# Patient Record
Sex: MALE | Race: BLACK OR AFRICAN AMERICAN | Employment: FULL TIME | RURAL
[De-identification: names, ages, dates, MRNs, and addresses within clinical notes are randomized per-mention and may not be internally consistent; named-entity substitution may affect disease eponyms.]

---

## 2018-06-20 ENCOUNTER — OFFICE VISIT (OUTPATIENT)
Dept: FAMILY MEDICINE CLINIC | Age: 48
End: 2018-06-20

## 2018-06-20 VITALS
WEIGHT: 199 LBS | RESPIRATION RATE: 18 BRPM | HEART RATE: 71 BPM | BODY MASS INDEX: 27.86 KG/M2 | SYSTOLIC BLOOD PRESSURE: 140 MMHG | OXYGEN SATURATION: 99 % | DIASTOLIC BLOOD PRESSURE: 105 MMHG | HEIGHT: 71 IN | TEMPERATURE: 97.6 F

## 2018-06-20 DIAGNOSIS — D50.9 MICROCYTIC ANEMIA: ICD-10-CM

## 2018-06-20 DIAGNOSIS — R35.0 BENIGN PROSTATIC HYPERPLASIA WITH URINARY FREQUENCY: ICD-10-CM

## 2018-06-20 DIAGNOSIS — Z00.00 ROUTINE GENERAL MEDICAL EXAMINATION AT A HEALTH CARE FACILITY: Primary | ICD-10-CM

## 2018-06-20 DIAGNOSIS — N40.1 BENIGN PROSTATIC HYPERPLASIA WITH URINARY FREQUENCY: ICD-10-CM

## 2018-06-20 DIAGNOSIS — E78.5 HYPERLIPIDEMIA, UNSPECIFIED HYPERLIPIDEMIA TYPE: ICD-10-CM

## 2018-06-20 DIAGNOSIS — Z23 ENCOUNTER FOR IMMUNIZATION: ICD-10-CM

## 2018-06-20 DIAGNOSIS — R03.0 ELEVATED BLOOD-PRESSURE READING WITHOUT DIAGNOSIS OF HYPERTENSION: ICD-10-CM

## 2018-06-20 NOTE — PROGRESS NOTES
Mago Belcher is a 56 y.o. male who presents to the office today with the following:  Chief Complaint   Patient presents with   [facility] Establish Care     check up       No Known Allergies    No current outpatient prescriptions on file. No current facility-administered medications for this visit. Past Medical History:   Diagnosis Date    Hyperlipidemia        No past surgical history on file. History   Smoking Status    Never Smoker   Smokeless Tobacco    Not on file       Family History   Problem Relation Age of Onset    Lupus Mother     Cancer Father      lung         History of Present Illness:  Patient here for routine medical checkup and medical follow-up    Past medical history significant only for hyperlipidemia with a cholesterol 250 for a couple of years ago. Patient states she is trying to work on a healthy diet and would like to have this rechecked. Blood pressure was elevated today. He states he does not have a history of hypertension. Blood pressure has been normal when he is at the dentist office. He admits he is quite anxious today. He has no cardiac complaints. There is a family history of hypertension. He is willing to have some lab work and schedule follow-up. Patient has a elevated BMI however he is quite muscular and carries a lot of his weight in his muscle mass. He does admit however that he used to jog and has not been recently. He plans to restart his exercise program    Patient states he has always had a sensitive GI tract. Since childhood he cannot eat chocolate without getting diarrhea. Now he states he gets diarrhea couple times a month especially after eating certain foods which are fatty. No abdominal pain or other GI complaints. No bleeding noted. He does get some perirectal itching at times. There is no family history of colon cancer. He mentioned he was interested in being screened for colon cancer.   New guidelines indicate that he could be screened at his age with a colonoscopy. He is aware but does not want to proceed with that at present. Digital rectal exam was negative today. Hemoccult was -6/18    His past medical history is otherwise negative    His father had lung cancer. No other cancer in first-degree relatives    His prostate was minimally enlarged on exam 6/18. He states he has noted he urinates a bit more frequently as he has gotten older. No personal family history prostate cancer. PSA 6/18    Patient works as a  in a private home  Does not smoke  Does drink some alcohol    Is been over 10 years since she has had a tetanus booster and he was given a Tdap today          Review of Systems:    Review of systems negative except as noted above      Physical Exam:  Visit Vitals    BP (!) 140/105    Pulse 71    Temp 97.6 °F (36.4 °C) (Temporal)    Resp 18    Ht 5' 11\" (1.803 m)    Wt 199 lb (90.3 kg)    SpO2 99%    BMI 27.75 kg/m2     Vitals:    06/20/18 1136 06/20/18 1215   BP: (!) 145/111 (!) 140/105   BP 1 Location: Right arm    BP Patient Position: Sitting    Pulse: 71    Resp: 18    Temp: 97.6 °F (36.4 °C)    TempSrc: Temporal    SpO2: 99%    Weight: 199 lb (90.3 kg)    Height: 5' 11\" (1.803 m)      Patient no acute distress vital signs stable as above on repeat bilaterally  Head is normocephalic  External ears normal.  Ear canals normal TMs were clear. Hearing was normal  Eyes PERRLA. EOMs full. Sclera clear  Nose within normal limits. Nares  normal.  No significant congestion  OP mucosa normal, no obvious lesions. Pharynx normal.  No erythema or exudate. Structures midline. Good dental repair  Neck no nodes no masses no bruits  Chest was clear no wheezes rhonchi or rales. Good air exchange  Cor regular rate and rhythm no S3-S4 no murmurs  Abdomen no HSM no masses soft and was nontender  Normal male genitalia. Normal phallus. Descended testes.   No masses no hernia  External rectal area was normal.  Digital exam revealed no masses. Prostate mildly enlarged. Normal texture. No nodules  Extremities no edema. Nontender. Good range of motion  Gait and gross neurologic exam were normal    Assessment/Plan:  1. Routine general medical examination at a health care facility      2. Elevated blood-pressure reading without diagnosis of hypertension  Patient did have elevated blood pressure today. He admits he is quite nervous. Previous blood pressure readings have been normal according to the patient. I am checking lab work today. He is going to work on his diet and excise program.  He will be back in 1 month for recheck and reassessment of his blood pressure  - CBC WITH AUTOMATED DIFF  - METABOLIC PANEL, COMPREHENSIVE  - URINALYSIS W/ RFLX MICROSCOPIC  - TSH 3RD GENERATION  - LIPID PANEL    3. Hyperlipidemia, unspecified hyperlipidemia type  We will repeat lab work today    4. Benign prostatic hyperplasia with urinary frequency  Minimally enlarged prostate on exam.  Mild symptoms. Checking PSA. - PROSTATE SPECIFIC AG    5. Encounter for immunization    - Tetanus, diphtheria toxoids and acellular pertussis (TDAP) vaccine, in individuals >=7 years, IM    6. Screening for colon cancer. Hemoccult was negative today. Patient is going to call me when he is ready to proceed with colonoscopy    Patient Instructions   Lab work today  Work on [employer] and exercise  Follow-up 1 month  Call if you would like to schedule screening colonoscopy          Continue current therapy plan except for indicated above. Verbal and written instructions (see AVS) provided.  Patient expresses understanding of diagnosis and treatment plan. Follow-up Disposition:  Return in about 1 month (around 7/20/2018). Alissa Romo.  Alberto Lopez MD

## 2018-06-20 NOTE — MR AVS SNAPSHOT
[facility] 6 
[phone] Patient: Rubi Driver MRN: [id number] HXO:[birth year] Visit Information Date & Time Provider Department Dept. Phone Encounter #  
 6/20/2018 11:10 AM Abe Jarrell MD [address] [place] [address] [phone] [id number] Follow-up Instructions Return in about 1 month (around 7/20/2018). Your Appointments 7/18/2018 11:30 AM  
Any with Abe Jarrell MD  
[address] [place] [address] [facility] Appt Note: 1 mo f/u BP, CP$40/km/6.20.2018  
 [address] [phone]  
[phone]  
  
   
 [address] [address] Upcoming Health Maintenance Date Due DTaP/Tdap/Td series (1 - Tdap) 9/29/1992 Influenza Age 5 to Adult 8/1/2018 Allergies as of 6/20/2018  Review Complete On: 6/20/2018 By: Abe Jarrell MD  
 No Known Allergies Current Immunizations  Never Reviewed Name Date Tdap  Incomplete Not reviewed this visit You Were Diagnosed With   
  
 Codes Comments Routine general medical examination at a health care facility    -  Primary ICD-10-CM: Z00.00 ICD-9-CM: V70.0 Elevated blood-pressure reading without diagnosis of hypertension     ICD-10-CM: R03.0 ICD-9-CM: 796.2 Hyperlipidemia, unspecified hyperlipidemia type     ICD-10-CM: E78.5 ICD-9-CM: 272.4 Benign prostatic hyperplasia with urinary frequency     ICD-10-CM: N40.1, R35.0 ICD-9-CM: 600.01, 788.41 Encounter for immunization     ICD-10-CM: B53 ICD-9-CM: V03.89 Vitals BP Pulse Temp Resp Height(growth percentile) Weight(growth percentile) (!) 140/105 71 97.6 °F (36.4 °C) (Temporal) 18 5' 11\" (1.803 m) 199 lb (90.3 kg) SpO2 BMI Smoking Status 99% 27.75 kg/m2 Never Smoker Vitals History BMI and BSA Data Body Mass Index Body Surface Area 27.75 kg/m 2 2.13 m 2 Your Updated Medication List  
  
Notice  As of 6/20/2018 12:38 PM  
 You have not been prescribed any medications. We Performed the Following CBC WITH AUTOMATED DIFF [51306 CPT(R)] COLLECTION VENOUS BLOOD,VENIPUNCTURE [id number] CPT(R)] LIPID PANEL [75741 CPT(R)] METABOLIC PANEL, COMPREHENSIVE [74758 CPT(R)] IA HANDLG&/OR CONVEY OF SPEC FOR TR OFFICE TO LAB [82208 CPT(R)] PSA, DIAGNOSTIC (PROSTATE SPECIFIC AG) [id number] CPT(R)] TETANUS, DIPHTHERIA TOXOIDS AND ACELLULAR PERTUSSIS VACCINE (TDAP), IN INDIVIDS. >=7, IM [id number] CPT(R)] TSH 3RD GENERATION [68006 CPT(R)] URINALYSIS W/ RFLX MICROSCOPIC [32218 CPT(R)] Follow-up Instructions Return in about 1 month (around 7/20/2018). Patient Instructions Lab work today Work on [employer] and exercise Follow-up 1 month Call if you would like to schedule screening colonoscopy Introducing [facility]! Geraldo Montana introduces [employer] patient portal. Now you can access parts of your medical record, email your doctor's office, and request medication refills online. 1. In your internet browser, go to [email or web address] 2. Click on the First Time User? Click Here link in the Sign In box. You will see the New Member Sign Up page. 3. Enter your [employer] Access Code exactly as it appears below. You will not need to use this code after youve completed the sign-up process. If you do not sign up before the expiration date, you must request a new code. · [employer] Access Code: [email or web address] Expires: 9/18/2018 12:14 PM 
 
4. Enter the last four digits of your Social Security Number (xxxx) and Date of Birth (mm/dd/yyyy) as indicated and click Submit. You will be taken to the next sign-up page. 5. Create a [employer] ID. This will be your [employer] login ID and cannot be changed, so think of one that is secure and easy to remember. 6. Create a [employer] password. You can change your password at any time. 7. Enter your Password Reset Question and Answer. This can be used at a later time if you forget your password. 8. Enter your e-mail address. You will receive e-mail notification when new information is available in [address] 9. Click Sign Up. You can now view and download portions of your medical record. 10. Click the Download Summary menu link to download a portable copy of your medical information. If you have questions, please visit the Frequently Asked Questions section of the [employer] website. Remember, [employer] is NOT to be used for urgent needs. For medical emergencies, dial 911. Now available from your iPhone and Android! Please provide this summary of care documentation to your next provider. Your primary care clinician is listed as Yaneth Montes. If you have any questions after today's visit, please call [phone].

## 2018-06-20 NOTE — PATIENT INSTRUCTIONS
Lab work today  Work on [employer] and exercise  Follow-up 1 month  Call if you would like to schedule screening colonoscopy

## 2018-06-20 NOTE — PROGRESS NOTES
1. Have you been to the ER, urgent care clinic since your last visit? Hospitalized since your last visit? No    2. Have you seen or consulted any other health care providers outside of the [facility] since your last visit? Include any pap smears or colon screening.  No

## 2018-06-21 ENCOUNTER — TELEPHONE (OUTPATIENT)
Dept: FAMILY MEDICINE CLINIC | Age: 48
End: 2018-06-21

## 2018-06-21 LAB
ALBUMIN SERPL-MCNC: 4.5 G/DL (ref 3.5–5.5)
ALBUMIN/GLOB SERPL: 1.6 {RATIO} (ref 1.2–2.2)
ALP SERPL-CCNC: 78 IU/L (ref 39–117)
ALT SERPL-CCNC: 22 IU/L (ref 0–44)
APPEARANCE UR: CLEAR
AST SERPL-CCNC: 18 IU/L (ref 0–40)
BASOPHILS # BLD AUTO: 0 X10E3/UL (ref 0–0.2)
BASOPHILS NFR BLD AUTO: 1 %
BILIRUB SERPL-MCNC: 0.2 MG/DL (ref 0–1.2)
BILIRUB UR QL STRIP: NEGATIVE
BUN SERPL-MCNC: 7 MG/DL (ref 6–24)
BUN/CREAT SERPL: 8 (ref 9–20)
CALCIUM SERPL-MCNC: 9.4 MG/DL (ref 8.7–10.2)
CHLORIDE SERPL-SCNC: 103 MMOL/L (ref 96–106)
CHOLEST SERPL-MCNC: 210 MG/DL (ref 100–199)
CO2 SERPL-SCNC: 21 MMOL/L (ref 20–29)
COLOR UR: YELLOW
CREAT SERPL-MCNC: 0.92 MG/DL (ref 0.76–1.27)
EOSINOPHIL # BLD AUTO: 0.1 X10E3/UL (ref 0–0.4)
EOSINOPHIL NFR BLD AUTO: 2 %
ERYTHROCYTE [DISTWIDTH] IN BLOOD BY AUTOMATED COUNT: 16.8 % (ref 12.3–15.4)
GFR SERPLBLD CREATININE-BSD FMLA CKD-EPI: 115 ML/MIN/1.73
GFR SERPLBLD CREATININE-BSD FMLA CKD-EPI: 99 ML/MIN/1.73
GLOBULIN SER CALC-MCNC: 2.9 G/DL (ref 1.5–4.5)
GLUCOSE SERPL-MCNC: 100 MG/DL (ref 65–99)
GLUCOSE UR QL: NEGATIVE
HCT VFR BLD AUTO: 37.3 % (ref 37.5–51)
HDLC SERPL-MCNC: 33 MG/DL
HGB BLD-MCNC: 11.6 G/DL (ref 13–17.7)
HGB UR QL STRIP: NEGATIVE
IMM GRANULOCYTES # BLD: 0 X10E3/UL (ref 0–0.1)
IMM GRANULOCYTES NFR BLD: 0 %
INTERPRETATION, 910389: NORMAL
KETONES UR QL STRIP: NEGATIVE
LDLC SERPL CALC-MCNC: 145 MG/DL (ref 0–99)
LEUKOCYTE ESTERASE UR QL STRIP: NEGATIVE
LYMPHOCYTES # BLD AUTO: 1.7 X10E3/UL (ref 0.7–3.1)
LYMPHOCYTES NFR BLD AUTO: 36 %
MCH RBC QN AUTO: 21.7 PG (ref 26.6–33)
MCHC RBC AUTO-ENTMCNC: 31.1 G/DL (ref 31.5–35.7)
MCV RBC AUTO: 70 FL (ref 79–97)
MICRO URNS: NORMAL
MONOCYTES # BLD AUTO: 0.4 X10E3/UL (ref 0.1–0.9)
MONOCYTES NFR BLD AUTO: 8 %
NEUTROPHILS # BLD AUTO: 2.5 X10E3/UL (ref 1.4–7)
NEUTROPHILS NFR BLD AUTO: 53 %
NITRITE UR QL STRIP: NEGATIVE
PH UR STRIP: 5.5 [PH] (ref 5–7.5)
PLATELET # BLD AUTO: 271 X10E3/UL (ref 150–379)
POTASSIUM SERPL-SCNC: 4.3 MMOL/L (ref 3.5–5.2)
PROT SERPL-MCNC: 7.4 G/DL (ref 6–8.5)
PROT UR QL STRIP: NEGATIVE
PSA SERPL-MCNC: 2.1 NG/ML (ref 0–4)
RBC # BLD AUTO: 5.34 X10E6/UL (ref 4.14–5.8)
SODIUM SERPL-SCNC: 142 MMOL/L (ref 134–144)
SP GR UR: 1.03 (ref 1–1.03)
TRIGL SERPL-MCNC: 162 MG/DL (ref 0–149)
TSH SERPL DL<=0.005 MIU/L-ACNC: 2.33 UIU/ML (ref 0.45–4.5)
UROBILINOGEN UR STRIP-MCNC: 0.2 MG/DL (ref 0.2–1)
VLDLC SERPL CALC-MCNC: 32 MG/DL (ref 5–40)
WBC # BLD AUTO: 4.7 X10E3/UL (ref 3.4–10.8)

## 2018-06-26 NOTE — PROGRESS NOTES
Labs reviewed please notify patient  Chemistry panel, thyroid functions, PSA prostate test all normal    Lipids improved from previous.   LDL still borderline at 145  Patient needs to continue working on his low-cholesterol diet    CBC does show a microcytic anemia with normal iron levels  He probably has a mild form of an inherited anemia  I would like him to come by the clinic for nonfasting blood work at his convenience to check a hemoglobin electrophoresis to further evaluate this    He should keep his planned follow-up with me next month for recheck and follow-up

## 2018-06-27 LAB
FERRITIN SERPL-MCNC: 227 NG/ML (ref 30–400)
IRON SATN MFR SERPL: 24 % (ref 15–55)
IRON SERPL-MCNC: 68 UG/DL (ref 38–169)
SPECIMEN STATUS REPORT, ROLRST: NORMAL
TIBC SERPL-MCNC: 285 UG/DL (ref 250–450)
UIBC SERPL-MCNC: 217 UG/DL (ref 111–343)

## 2018-07-18 ENCOUNTER — OFFICE VISIT (OUTPATIENT)
Dept: FAMILY MEDICINE CLINIC | Age: 48
End: 2018-07-18

## 2018-07-18 VITALS
SYSTOLIC BLOOD PRESSURE: 135 MMHG | HEART RATE: 98 BPM | HEIGHT: 71 IN | RESPIRATION RATE: 16 BRPM | OXYGEN SATURATION: 98 % | BODY MASS INDEX: 27.52 KG/M2 | WEIGHT: 196.6 LBS | DIASTOLIC BLOOD PRESSURE: 95 MMHG | TEMPERATURE: 98 F

## 2018-07-18 DIAGNOSIS — R03.0 ELEVATED BLOOD-PRESSURE READING WITHOUT DIAGNOSIS OF HYPERTENSION: Primary | ICD-10-CM

## 2018-07-18 DIAGNOSIS — N40.1 BENIGN PROSTATIC HYPERPLASIA WITH URINARY FREQUENCY: ICD-10-CM

## 2018-07-18 DIAGNOSIS — E78.5 HYPERLIPIDEMIA, UNSPECIFIED HYPERLIPIDEMIA TYPE: ICD-10-CM

## 2018-07-18 DIAGNOSIS — R35.0 BENIGN PROSTATIC HYPERPLASIA WITH URINARY FREQUENCY: ICD-10-CM

## 2018-07-18 DIAGNOSIS — D50.9 MICROCYTIC ANEMIA: ICD-10-CM

## 2018-07-18 NOTE — PROGRESS NOTES
Chief Complaint   Patient presents with    Hypertension     1 mo f/u; non-fasting today for requested blood test     There are no preventive care reminders to display for this patient. Visit Vitals    /85 (BP 1 Location: Left arm, BP Patient Position: Sitting)    Pulse 98    Temp 98 °F (36.7 °C) (Oral)    Resp 16    Ht 5' 11\" (1.803 m)    Wt 196 lb 9.6 oz (89.2 kg)    SpO2 98%    BMI 27.42 kg/m2     1. Have you been to the ER, urgent care clinic since your last visit? Hospitalized since your last visit? No    2. Have you seen or consulted any other health care providers outside of the [address] [place] [address] since your last visit? Include any pap smears or colon screening.  No    Sony Rose LPN

## 2018-07-18 NOTE — PROGRESS NOTES
Halima Zafar is a 56 y.o. male who presents to the office today with the following:  Chief Complaint   Patient presents with    Hypertension     1 mo f/u; non-fasting today for requested blood test       No Known Allergies    No current outpatient prescriptions on file. No current facility-administered medications for this visit. Past Medical History:   Diagnosis Date    Anemia     Hyperlipidemia     Hypertension        History reviewed. No pertinent surgical history. History   Smoking Status    Never Smoker   Smokeless Tobacco    Never Used       Family History   Problem Relation Age of Onset    Lupus Mother     Cancer Father      lung    Thalassemia Father      Minor         History of Present Illness:  Patient here for ongoing medical follow-up from his visit in June    Past medical history significant  for hyperlipidemia with a cholesterol 250 for a couple of years ago. Patient states he has been working on his diet. Lab work just done showed the cholesterol significantly improved to 210. Triglycerides improved to 162. Thyroid functions normal.  Patient was pleased with his progress and wants to continue working on his diet and exercise before contemplating medications    Patient's blood pressure was elevated at the previous visit. He states he does not have a history of hypertension. He has no cardiac complaints. There is a family history of hypertension. Lab work revealed normal chemistry panel urinalysis and thyroid functions. As noted he has been working on his diet and exercise and his blood pressures improved today but still mildly elevated. Patient would like to continue working on his lifestyle changes for another couple of months. He does agree to follow-up in 3 months    Patient has a elevated BMI however he is quite muscular and carries a lot of his weight in his muscle mass. He does admit however that he used to jog and has not been recently.   As noted he has been working harder on his diet and excise program and has lost 3 pounds since his last visit. He was found to have microcytosis on his CBC. Iron levels were normal.  I have ordered a follow-up hemoglobin electrophoresis. In the interim patient has found out that his parents have thalassemia minor. It is almost certain that he has thalassemia minor. We will proceed with the additional blood testing    Patient states he has always had a sensitive GI tract. Since childhood he cannot eat chocolate without getting diarrhea. Now he states he gets diarrhea couple times a month especially after eating certain foods which are fatty. No abdominal pain or other GI complaints. No bleeding noted. He does get some perirectal itching at times. There is no family history of colon cancer. He mentioned he was interested in being screened for colon cancer. New guidelines indicate that he could be screened at his age with a colonoscopy. He is aware but does not want to proceed with that at present. Digital rectal exam was negative 6/18. Hemoccult was negative 6/18 patient     . He notes as he gets older he urinates a bit more frequently. No other prostate symptoms. Does have mild BPH prostate was mildly enlarged on exam 6/18. PSA normal 2.1        His past medical history is otherwise negative    His father had lung cancer.   No other cancer in first-degree relatives      Patient works as a  in a private home  Does not smoke  Does drink some alcohol    Tdap given 6/18          Review of Systems:    Review of systems negative except as noted above      Physical Exam:  Visit Vitals    BP (!) 135/95    Pulse 98    Temp 98 °F (36.7 °C) (Oral)    Resp 16    Ht 5' 11\" (1.803 m)    Wt 196 lb 9.6 oz (89.2 kg)    SpO2 98%    BMI 27.42 kg/m2     Vitals:    07/18/18 1137 07/18/18 1219   BP: 124/85 (!) 135/95   BP 1 Location: Left arm    BP Patient Position: Sitting    Pulse: 98    Resp: 16    Temp: 98 °F (36.7 °C)    TempSrc: Oral    SpO2: 98%    Weight: 196 lb 9.6 oz (89.2 kg)    Height: 5' 11\" (1.803 m)      Patient no acute distress vital signs stable as above on repeat bilaterally  Head is normocephalic  External ears normal.   Neck no nodes no masses no bruits  Chest was clear no wheezes rhonchi or rales. Good air exchange  Cor regular rate and rhythm no S3-S4 no murmurs  Abdomen no HSM no masses soft and was nontender    Extremities no edema. Nontender. Good range of motion  Gait and gross neurologic exam were normal    1. Elevated blood-pressure reading without diagnosis of hypertension  Mild increase in blood pressure improving since last month. Patient can continue with his exercise and lifestyle changes. I will see him back in 3 months for recheck. He i will be monitoring his blood pressure in the interim    2. Microcytic anemia  Suspect thalassemia minor. Checking hemoglobin electrophoresis  - IA HANDLG&/OR CONVEY OF SPEC FOR TR OFFICE TO LAB  - COLLECTION VENOUS BLOOD,VENIPUNCTURE    3. Hyperlipidemia, unspecified hyperlipidemia type  Improving with diet and exercise. We will continue and recheck at follow-up visit    4. Benign prostatic hyperplasia with urinary frequency  Minimally symptomatic PSA normal      Patient Instructions   Continue working her diet and exercise  Await blood work  Check your blood pressure occasionally and record  Follow-up 3 months          Continue current therapy plan except for indicated above. Verbal and written instructions (see AVS) provided.  Patient expresses understanding of diagnosis and treatment plan. Follow-up Disposition:  Return in about 3 months (around 10/18/2018). Brittaney Damian.  Kishan López MD

## 2018-07-18 NOTE — PATIENT INSTRUCTIONS
Continue working her diet and exercise  Await blood work  Check your blood pressure occasionally and record  Follow-up 3 months

## 2018-07-19 LAB — SPECIMEN STATUS REPORT, ROLRST: NORMAL

## 2018-07-21 LAB
HGB A MFR BLD: 97.8 % (ref 96.4–98.8)
HGB A2 MFR BLD COLUMN CHROM: 2.2 % (ref 1.8–3.2)
HGB C MFR BLD: 0 %
HGB F MFR BLD: 0 % (ref 0–2)
HGB FRACT BLD-IMP: NORMAL
HGB OTHER MFR BLD HPLC: 0 %
HGB S BLD QL SOLY: NEGATIVE
HGB S MFR BLD: 0 %

## 2021-03-10 ENCOUNTER — OFFICE VISIT (OUTPATIENT)
Dept: FAMILY MEDICINE CLINIC | Age: 51
End: 2021-03-10
Payer: COMMERCIAL

## 2021-03-10 VITALS
SYSTOLIC BLOOD PRESSURE: 138 MMHG | BODY MASS INDEX: 27.6 KG/M2 | WEIGHT: 197.13 LBS | HEIGHT: 71 IN | TEMPERATURE: 97.7 F | OXYGEN SATURATION: 99 % | RESPIRATION RATE: 18 BRPM | DIASTOLIC BLOOD PRESSURE: 84 MMHG | HEART RATE: 76 BPM

## 2021-03-10 DIAGNOSIS — R35.0 BENIGN PROSTATIC HYPERPLASIA WITH URINARY FREQUENCY: ICD-10-CM

## 2021-03-10 DIAGNOSIS — N40.1 BENIGN PROSTATIC HYPERPLASIA WITH URINARY FREQUENCY: ICD-10-CM

## 2021-03-10 DIAGNOSIS — Z00.00 ROUTINE GENERAL MEDICAL EXAMINATION AT A HEALTH CARE FACILITY: Primary | ICD-10-CM

## 2021-03-10 DIAGNOSIS — L29.0 PRURITUS ANI: ICD-10-CM

## 2021-03-10 DIAGNOSIS — E78.5 HYPERLIPIDEMIA, UNSPECIFIED HYPERLIPIDEMIA TYPE: ICD-10-CM

## 2021-03-10 DIAGNOSIS — D50.9 MICROCYTIC ANEMIA: ICD-10-CM

## 2021-03-10 DIAGNOSIS — Z23 ENCOUNTER FOR IMMUNIZATION: ICD-10-CM

## 2021-03-10 LAB
BILIRUB UR QL STRIP: NEGATIVE
GLUCOSE UR-MCNC: NEGATIVE MG/DL
KETONES P FAST UR STRIP-MCNC: NEGATIVE MG/DL
PH UR STRIP: 7 [PH] (ref 4.6–8)
PROT UR QL STRIP: NEGATIVE
SP GR UR STRIP: 1.02 (ref 1–1.03)
UA UROBILINOGEN AMB POC: NORMAL (ref 0.2–1)
URINALYSIS CLARITY POC: CLEAR
URINALYSIS COLOR POC: YELLOW
URINE BLOOD POC: NEGATIVE
URINE LEUKOCYTES POC: NEGATIVE
URINE NITRITES POC: NEGATIVE

## 2021-03-10 PROCEDURE — 90686 IIV4 VACC NO PRSV 0.5 ML IM: CPT | Performed by: FAMILY MEDICINE

## 2021-03-10 PROCEDURE — 81003 URINALYSIS AUTO W/O SCOPE: CPT | Performed by: FAMILY MEDICINE

## 2021-03-10 PROCEDURE — 90471 IMMUNIZATION ADMIN: CPT | Performed by: FAMILY MEDICINE

## 2021-03-10 PROCEDURE — 99396 PREV VISIT EST AGE 40-64: CPT | Performed by: FAMILY MEDICINE

## 2021-03-10 RX ORDER — HYDROCORTISONE 25 MG/G
CREAM TOPICAL 4 TIMES DAILY
Qty: 30 G | Refills: 0 | Status: SHIPPED | OUTPATIENT
Start: 2021-03-10 | End: 2022-06-22 | Stop reason: ALTCHOICE

## 2021-03-10 NOTE — PROGRESS NOTES
1. Have you been to the ER, urgent care clinic since your last visit? Hospitalized since your last visit? No    2. Have you seen or consulted any other health care providers outside of the [address] [place] [address] since your last visit? Include any pap smears or colon screening. No     CHIEF COMPLAINT  Mr. Giovanni Huff is a 53 y.o. male who is here for evaluation of the issues below:    Chief Complaint   Patient presents with    Complete Physical         HISTORY OF PRESENT ILLNESS    Generally healthy AA male presents for wellness exam and a couple of specific complaints. Magdalene anal itching for a few weeks. Denies any high risk sexual activity, no discharge or bleeding. Continues to work as a , stays active and is glad he's been able to keep his weight down. BP has been high normal range last couple of years. Patient Active Problem List   Diagnosis Code    Hyperlipidemia E78.5    Benign prostatic hyperplasia with urinary frequency N40.1, R35.0    Elevated blood-pressure reading without diagnosis of hypertension R03.0       No Known Allergies    Current Outpatient Medications   Medication Sig    hydrocortisone (ANUSOL-HC) 2.5 % rectal cream Insert  into rectum four (4) times daily. No current facility-administered medications for this visit. Social History     Tobacco Use    Smoking status: Never Smoker    Smokeless tobacco: Never Used   Substance Use Topics    Alcohol use: Yes     Frequency: Monthly or less     Drinks per session: 1 or 2     Comment: rare    Drug use: No       Past Medical History:   Diagnosis Date    Anemia     Hyperlipidemia     Hypertension        History reviewed. No pertinent surgical history.     Family History   Problem Relation Age of Onset    Lupus Mother 54    Thalassemia Father         Minor    Lung Cancer Father 79        lung    MS Sister     Hypertension Brother     No Known Problems Sister          The medications were reviewed and updated in the medical record. The past medical history, past surgical history, and family history were reviewed and updated in the medical record. ROS    CONST No weight change. No fatigue. No fever. No chills. No diaphoresis. EYES No loss of vision. No tearing or itching. ENMT No loss of hearing, tinnitus, or vertigo. No nasal congestion, sneezing. No sinus pain or discharge. No sore throat or hoarseness. CV No chest pain or pressure. No palpitations. No edema. No orthopnea or PND. RESP No shortness of breath, cough, or sputum. GI No abdominal pain or change in bowel habits. No heartburn. No dysphagia. No melena or rectal bleeding. See HPI    No dysuria, no frequency and no hematuria. No urgency or incontinence. No back or flank pain. MSKEL No joint pain or swelling. No muscle pain. SKIN No rash or lesions. No itching or dryness. NEURO No headache, syncope, or seizure. No weakness, loss of sensation, or paresthesias. PSYCH No low mood or anhedonia. No anxiety. ENDO No heat or cold intolerance. No polydipsia or polyuria. HE/LYMPH No easy bruising, abnormal bleeding, or enlarged glands. PHYSICAL EXAMINATION    VITALS Visit Vitals  /84 (BP 1 Location: Left upper arm, BP Patient Position: Sitting, BP Cuff Size: Adult)   Pulse 76   Temp 97.7 °F (36.5 °C) (Oral)   Resp 18   Ht 5' 11\" (1.803 m)   Wt 197 lb 2 oz (89.4 kg)   SpO2 99%   BMI 27.49 kg/m²      Const Well-developed,well nourished and no acute distress   Eyes Conjunctiva and lids normal.    PERRLA, EOMI.   ENMT External ears and nose normal.  Canals normal, TMs normal.    Nose without edema or discharge, with normal septum. Lips, teeth, gums normal, mucous membranes moist.    Oropharynx: no erythema, no exudates, no lesions, normal tongue. NECK Thyroid: normal size, nontender. Carotids 2+ with no bruits. No enlarged nodes in neck   RESP Normal air movement.   No rales, wheezes, rhonchi, or rubs.   CV RRR, with no S3 or S4, no murmur, and no rub. GI   Normal bowel sounds, no bruit, soft, nontender, without masses. No hepatosplenomegaly. No bruits. No hernia. Rectal: normal tone, no hemorrhoids. Prostate firm, NT, unenlarged. No rectal masses    MSKEL Normal gait and station. Normal digits and nails. Normal strength and tone, no atrophy, and no abnormal movement. EXT Extremities without varicose veins or edema. DP and PT 2+ bilaterally. SKIN No rash, no lesions, no ulcers or erythema. NEURO Cranial nerves normal 2-12. Sensation normal to light touch. DTR 2+ in upper and lower extremities. PSYCH Judgment and insight good. Oriented to person, place, and time. Affect is alert and attentive. Somewhat flat     Results for orders placed or performed in visit on 03/10/21   AMB POC URINALYSIS DIP STICK AUTO W/O MICRO   Result Value Ref Range    Color (UA POC) Yellow     Clarity (UA POC) Clear     Glucose (UA POC) Negative Negative    Bilirubin (UA POC) Negative Negative    Ketones (UA POC) Negative Negative    Specific gravity (UA POC) 1.025 1.001 - 1.035    Blood (UA POC) Negative Negative    pH (UA POC) 7.0 4.6 - 8.0    Protein (UA POC) Negative Negative    Urobilinogen (UA POC) 0.2 mg/dL 0.2 - 1    Nitrites (UA POC) Negative Negative    Leukocyte esterase (UA POC) Negative Negative         ASSESSMENT AND PLAN    1. Routine general medical examination at a health care facility  90 minutes spent discussing health maintenance. Greatest health risk is development of HTN. He will continue to work on exercising regularly and keeping his weight down. 2. Pruritus ani    - hydrocortisone (ANUSOL-HC) 2.5 % rectal cream; Insert  into rectum four (4) times daily. Dispense: 30 g; Refill: 0    3. Hyperlipidemia, unspecified hyperlipidemia type    - LIPID PANEL; Future  - METABOLIC PANEL, COMPREHENSIVE; Future  - LIPID PANEL  - METABOLIC PANEL, COMPREHENSIVE    4.  Benign prostatic hyperplasia with urinary frequency    - AMB POC URINALYSIS DIP STICK AUTO W/O MICRO  - PSA W/ REFLX FREE PSA; Future  - PSA W/ REFLX FREE PSA    5. Encounter for immunization    - INFLUENZA VIRUS VAC QUAD,SPLIT,PRESV FREE SYRINGE IM    6. Microcytic anemia    - CBC WITH AUTOMATED DIFF; Future  - CBC WITH AUTOMATED DIFF  . Orders Placed This Encounter    INFLUENZA VIRUS VAC QUAD,SPLIT,PRESV FREE SYRINGE IM (Flulaval, Fluzone, Fluarix) ([id number])    LIPID PANEL     Standing Status:   Future     Number of Occurrences:   1     Standing Expiration Date:   3/10/2022    CBC WITH AUTOMATED DIFF     Standing Status:   Future     Number of Occurrences:   1     Standing Expiration Date:   0/30/9909    METABOLIC PANEL, COMPREHENSIVE     Standing Status:   Future     Number of Occurrences:   1     Standing Expiration Date:   3/10/2022    PSA W/ REFLX FREE PSA     Standing Status:   Future     Number of Occurrences:   1     Standing Expiration Date:   3/10/2022    AMB POC URINALYSIS DIP STICK AUTO W/O MICRO     AMB POC URINALYSIS DIP STICK AUTO W/O    hydrocortisone (ANUSOL-HC) 2.5 % rectal cream     Sig: Insert  into rectum four (4) times daily.      Dispense:  30 g     Refill:  0         Jayne Sarabia MD

## 2021-03-13 LAB
ALBUMIN SERPL-MCNC: 4.6 G/DL (ref 4–5)
ALBUMIN/GLOB SERPL: 1.8 {RATIO} (ref 1.2–2.2)
ALP SERPL-CCNC: 71 IU/L (ref 39–117)
ALT SERPL-CCNC: 26 IU/L (ref 0–44)
AST SERPL-CCNC: 25 IU/L (ref 0–40)
BASOPHILS # BLD AUTO: 0.1 X10E3/UL (ref 0–0.2)
BASOPHILS NFR BLD AUTO: 1 %
BILIRUB SERPL-MCNC: 0.2 MG/DL (ref 0–1.2)
BUN SERPL-MCNC: 7 MG/DL (ref 6–24)
BUN/CREAT SERPL: 8 (ref 9–20)
CALCIUM SERPL-MCNC: 9.7 MG/DL (ref 8.7–10.2)
CHLORIDE SERPL-SCNC: 101 MMOL/L (ref 96–106)
CHOLEST SERPL-MCNC: 243 MG/DL (ref 100–199)
CO2 SERPL-SCNC: 24 MMOL/L (ref 20–29)
CREAT SERPL-MCNC: 0.88 MG/DL (ref 0.76–1.27)
EOSINOPHIL # BLD AUTO: 0.1 X10E3/UL (ref 0–0.4)
EOSINOPHIL NFR BLD AUTO: 2 %
ERYTHROCYTE [DISTWIDTH] IN BLOOD BY AUTOMATED COUNT: 16.3 % (ref 11.6–15.4)
GLOBULIN SER CALC-MCNC: 2.6 G/DL (ref 1.5–4.5)
GLUCOSE SERPL-MCNC: 108 MG/DL (ref 65–99)
HCT VFR BLD AUTO: 40.4 % (ref 37.5–51)
HDLC SERPL-MCNC: 37 MG/DL
HGB BLD-MCNC: 12.2 G/DL (ref 13–17.7)
IMM GRANULOCYTES # BLD AUTO: 0 X10E3/UL (ref 0–0.1)
IMM GRANULOCYTES NFR BLD AUTO: 1 %
IMP & REVIEW OF LAB RESULTS: NORMAL
LDLC SERPL CALC-MCNC: 179 MG/DL (ref 0–99)
LYMPHOCYTES # BLD AUTO: 1.4 X10E3/UL (ref 0.7–3.1)
LYMPHOCYTES NFR BLD AUTO: 32 %
MCH RBC QN AUTO: 22.3 PG (ref 26.6–33)
MCHC RBC AUTO-ENTMCNC: 30.2 G/DL (ref 31.5–35.7)
MCV RBC AUTO: 74 FL (ref 79–97)
MONOCYTES # BLD AUTO: 0.4 X10E3/UL (ref 0.1–0.9)
MONOCYTES NFR BLD AUTO: 10 %
NEUTROPHILS # BLD AUTO: 2.5 X10E3/UL (ref 1.4–7)
NEUTROPHILS NFR BLD AUTO: 54 %
PLATELET # BLD AUTO: 284 X10E3/UL (ref 150–450)
POTASSIUM SERPL-SCNC: 4.4 MMOL/L (ref 3.5–5.2)
PROT SERPL-MCNC: 7.2 G/DL (ref 6–8.5)
PSA SERPL-MCNC: 3.2 NG/ML (ref 0–4)
RBC # BLD AUTO: 5.48 X10E6/UL (ref 4.14–5.8)
REFLEX CRITERIA: NORMAL
SODIUM SERPL-SCNC: 141 MMOL/L (ref 134–144)
TRIGL SERPL-MCNC: 145 MG/DL (ref 0–149)
VLDLC SERPL CALC-MCNC: 27 MG/DL (ref 5–40)
WBC # BLD AUTO: 4.5 X10E3/UL (ref 3.4–10.8)

## 2022-03-18 PROBLEM — R03.0 ELEVATED BLOOD-PRESSURE READING WITHOUT DIAGNOSIS OF HYPERTENSION: Status: ACTIVE | Noted: 2018-06-20

## 2022-03-20 PROBLEM — N40.1 BENIGN PROSTATIC HYPERPLASIA WITH URINARY FREQUENCY: Status: ACTIVE | Noted: 2018-06-20

## 2022-03-20 PROBLEM — R35.0 BENIGN PROSTATIC HYPERPLASIA WITH URINARY FREQUENCY: Status: ACTIVE | Noted: 2018-06-20

## 2022-06-22 ENCOUNTER — OFFICE VISIT (OUTPATIENT)
Dept: FAMILY MEDICINE CLINIC | Age: 52
End: 2022-06-22
Payer: COMMERCIAL

## 2022-06-22 VITALS
BODY MASS INDEX: 27.61 KG/M2 | HEART RATE: 73 BPM | OXYGEN SATURATION: 97 % | WEIGHT: 197.25 LBS | SYSTOLIC BLOOD PRESSURE: 129 MMHG | TEMPERATURE: 97.2 F | RESPIRATION RATE: 18 BRPM | DIASTOLIC BLOOD PRESSURE: 88 MMHG | HEIGHT: 71 IN

## 2022-06-22 DIAGNOSIS — E78.5 HYPERLIPIDEMIA, UNSPECIFIED HYPERLIPIDEMIA TYPE: Primary | ICD-10-CM

## 2022-06-22 DIAGNOSIS — Z11.59 ENCOUNTER FOR HEPATITIS C SCREENING TEST FOR LOW RISK PATIENT: ICD-10-CM

## 2022-06-22 DIAGNOSIS — Z12.5 PROSTATE CANCER SCREENING: ICD-10-CM

## 2022-06-22 DIAGNOSIS — Z13.1 DIABETES MELLITUS SCREENING: ICD-10-CM

## 2022-06-22 DIAGNOSIS — Z12.11 COLON CANCER SCREENING: ICD-10-CM

## 2022-06-22 DIAGNOSIS — Z00.00 ROUTINE GENERAL MEDICAL EXAMINATION AT A HEALTH CARE FACILITY: ICD-10-CM

## 2022-06-22 DIAGNOSIS — Z00.00 HEALTH MAINTENANCE EXAMINATION: ICD-10-CM

## 2022-06-22 DIAGNOSIS — Z23 ENCOUNTER FOR IMMUNIZATION: ICD-10-CM

## 2022-06-22 PROCEDURE — 36415 COLL VENOUS BLD VENIPUNCTURE: CPT | Performed by: FAMILY MEDICINE

## 2022-06-22 PROCEDURE — 99396 PREV VISIT EST AGE 40-64: CPT | Performed by: FAMILY MEDICINE

## 2022-06-22 PROCEDURE — 90471 IMMUNIZATION ADMIN: CPT | Performed by: FAMILY MEDICINE

## 2022-06-22 PROCEDURE — 90750 HZV VACC RECOMBINANT IM: CPT | Performed by: FAMILY MEDICINE

## 2022-06-22 NOTE — PROGRESS NOTES
1. \"Have you been to the ER, urgent care clinic since your last visit? Hospitalized since your last visit? \" No    2. \"Have you seen or consulted any other health care providers outside of the [address] [place] [address] since your last visit? \" No     3. For patients aged 39-70: Has the patient had a colonoscopy / FIT/ Cologuard? No      If the patient is female:    4. For patients aged 41-77: Has the patient had a mammogram within the past 2 years? NA - based on age or sex      11. For patients aged 21-65: Has the patient had a pap smear? NA - based on age or sex    CHIEF COMPLAINT  Mr. Ronda Hayes is a 49 y.o. male who is here for evaluation of the issues below:    Chief Complaint   Patient presents with    Complete Physical         HISTORY OF PRESENT ILLNESS    Returns feeling well. Stays active, walking every day. Weight has been stable in the present range. Nocturia x 1, not currently sexually active. Patient Active Problem List   Diagnosis Code    Hyperlipidemia E78.5    Benign prostatic hyperplasia with urinary frequency N40.1, R35.0    Elevated blood-pressure reading without diagnosis of hypertension R03.0       No Known Allergies    No current outpatient medications on file. No current facility-administered medications for this visit. Social History     Tobacco Use    Smoking status: Never Smoker    Smokeless tobacco: Never Used   Vaping Use    Vaping Use: Never used   Substance Use Topics    Alcohol use: Yes     Comment: rare    Drug use: No       Past Medical History:   Diagnosis Date    Anemia     Hyperlipidemia     Hypertension        History reviewed. No pertinent surgical history. Family History   Problem Relation Age of Onset    Lupus Mother 54    Thalassemia Father         Minor    Lung Cancer Father 79        lung    Mult Sclerosis Sister     Hypertension Brother     No Known Problems Sister          The medications were reviewed and updated in the medical record.   The past medical history, past surgical history, and family history were reviewed and updated in the medical record. ROS    CONST No weight change. No fatigue. No fever. No chills. No diaphoresis. EYES No loss of vision. No tearing or itching. ENMT No loss of hearing, tinnitus, or vertigo. No nasal congestion, sneezing. No sinus pain or discharge. No sore throat or hoarseness. CV No chest pain or pressure. No palpitations. No edema. No orthopnea or PND. RESP No shortness of breath, cough, or sputum. GI No abdominal pain or change in bowel habits. No heartburn. No dysphagia. No melena or rectal bleeding.  No dysuria, no frequency and no hematuria. No urgency or incontinence. No back or flank pain. MSKEL No joint pain or swelling. No muscle pain. SKIN No rash or lesions. No itching or dryness. NEURO No headache, syncope, or seizure. No weakness, loss of sensation, or paresthesias. PSYCH No low mood or anhedonia. No anxiety. ENDO No heat or cold intolerance. No polydipsia or polyuria. HE/LYMPH No easy bruising, abnormal bleeding, or enlarged glands. PHYSICAL EXAMINATION    VITALS Visit Vitals  /88 (BP 1 Location: Left upper arm, BP Patient Position: Sitting, BP Cuff Size: Adult)   Pulse 73   Temp 97.2 °F (36.2 °C) (Oral)   Resp 18   Ht 5' 11\" (1.803 m)   Wt 197 lb 4 oz (89.5 kg)   SpO2 97%   BMI 27.51 kg/m²      Const Well-developed,well nourished and no acute distress   Eyes Conjunctiva and lids normal.    PERRLA, EOMI.   ENMT External ears and nose normal.  Canals normal, TMs normal.    Nose without edema or discharge, with normal septum. Lips, teeth, gums normal, mucous membranes moist.    Oropharynx: no erythema, no exudates, no lesions, normal tongue. NECK Thyroid: normal size, nontender. Carotids 2+ with no bruits. No enlarged nodes in neck   RESP Normal air movement. No rales, wheezes, rhonchi, or rubs.    CV RRR, with no S3 or S4, no murmur, and no rub. GI   Normal bowel sounds, no bruit, soft, nontender, without masses. No hepatosplenomegaly. No bruits. No hernia. MSKEL Normal gait and station. Normal digits and nails. Normal strength and tone, no atrophy, and no abnormal movement. EXT Extremities without varicose veins or edema. DP and PT 2+ bilaterally. SKIN No rash, no lesions, no ulcers or erythema. NEURO Cranial nerves normal 2-12. Sensation normal to light touch. DTR 2+ in upper and lower extremities. PSYCH Judgment and insight good. Oriented to person, place, and time. Affect is alert and attentive. ASSESSMENT AND PLAN    1. Hyperlipidemia, unspecified hyperlipidemia type    - METABOLIC PANEL, COMPREHENSIVE; Future  - CBC WITH AUTOMATED DIFF; Future  - LIPID PANEL; Future  - METABOLIC PANEL, COMPREHENSIVE  - CBC WITH AUTOMATED DIFF  - LIPID PANEL  - [facility]&/OR CONVEY OF SPEC FOR TR OFFICE TO LAB  - COLLECTION VENOUS BLOOD,VENIPUNCTURE      3. Prostate cancer screening    - PSA W/ REFLX FREE PSA; Future  - PSA W/ REFLX FREE PSA    4. Colon cancer screening    - REFERRAL TO GASTROENTEROLOGY    5. Diabetes mellitus screening    - METABOLIC PANEL, COMPREHENSIVE; Future  - METABOLIC PANEL, COMPREHENSIVE    6. Encounter for hepatitis C screening test for low risk patient    - HEPATITIS C AB; Future  - HEPATITIS C AB    7. Encounter for immunization    - ZOSTER, SHINGRIX, (18 YRS +), IM    8.  Health maintenance examination  Discussed weight, exercise, risk of developing HTN      Orders Placed This Encounter    ZOSTER, [address] [place] [address], (18 YRS +), IM    METABOLIC PANEL, COMPREHENSIVE     Standing Status:   Future     Number of Occurrences:   1     Standing Expiration Date:   6/22/2023    CBC WITH AUTOMATED DIFF     Standing Status:   Future     Number of Occurrences:   1     Standing Expiration Date:   6/22/2023    LIPID PANEL     Standing Status:   Future     Number of Occurrences:   1 Standing Expiration Date:   6/22/2023    PSA W/ REFLX FREE PSA     Standing Status:   Future     Number of Occurrences:   1     Standing Expiration Date:   6/22/2023    HEPATITIS C AB     Standing Status:   Future     Number of Occurrences:   1     Standing Expiration Date:   6/22/2023    REFERRAL TO GASTROENTEROLOGY     Referral Priority:   Routine     Referral Type:   Consultation     Referral Reason:   Specialty Services Required     Referred to Provider:   Himanshu Goodson MD     Number of Visits Requested:   1    [facility]/OR CON[facility] Issa Kerr MD

## 2022-06-24 LAB
ALBUMIN SERPL-MCNC: 4.5 G/DL (ref 4–5)
ALBUMIN/GLOB SERPL: 1.7 {RATIO} (ref 1.2–2.2)
ALP SERPL-CCNC: 71 IU/L (ref 44–121)
ALT SERPL-CCNC: 16 IU/L (ref 0–44)
AST SERPL-CCNC: 16 IU/L (ref 0–40)
BASOPHILS # BLD AUTO: 0.1 X10E3/UL (ref 0–0.2)
BASOPHILS NFR BLD AUTO: 2 %
BILIRUB SERPL-MCNC: 0.2 MG/DL (ref 0–1.2)
BUN SERPL-MCNC: 8 MG/DL (ref 6–24)
BUN/CREAT SERPL: 11 (ref 9–20)
CALCIUM SERPL-MCNC: 9.3 MG/DL (ref 8.7–10.2)
CHLORIDE SERPL-SCNC: 103 MMOL/L (ref 96–106)
CHOLEST SERPL-MCNC: 229 MG/DL (ref 100–199)
CO2 SERPL-SCNC: 20 MMOL/L (ref 20–29)
CREAT SERPL-MCNC: 0.76 MG/DL (ref 0.76–1.27)
EGFR: 109 ML/MIN/1.73
EOSINOPHIL # BLD AUTO: 0.1 X10E3/UL (ref 0–0.4)
EOSINOPHIL NFR BLD AUTO: 2 %
ERYTHROCYTE [DISTWIDTH] IN BLOOD BY AUTOMATED COUNT: 16 % (ref 11.6–15.4)
GLOBULIN SER CALC-MCNC: 2.6 G/DL (ref 1.5–4.5)
GLUCOSE SERPL-MCNC: 95 MG/DL (ref 65–99)
HCT VFR BLD AUTO: 38.8 % (ref 37.5–51)
HCV AB S/CO SERPL IA: 0.1 S/CO RATIO (ref 0–0.9)
HDLC SERPL-MCNC: 33 MG/DL
HGB BLD-MCNC: 11.8 G/DL (ref 13–17.7)
IMM GRANULOCYTES # BLD AUTO: 0 X10E3/UL (ref 0–0.1)
IMM GRANULOCYTES NFR BLD AUTO: 0 %
IMP & REVIEW OF LAB RESULTS: NORMAL
LDLC SERPL CALC-MCNC: 177 MG/DL (ref 0–99)
LYMPHOCYTES # BLD AUTO: 1.5 X10E3/UL (ref 0.7–3.1)
LYMPHOCYTES NFR BLD AUTO: 37 %
MCH RBC QN AUTO: 21.9 PG (ref 26.6–33)
MCHC RBC AUTO-ENTMCNC: 30.4 G/DL (ref 31.5–35.7)
MCV RBC AUTO: 72 FL (ref 79–97)
MONOCYTES # BLD AUTO: 0.5 X10E3/UL (ref 0.1–0.9)
MONOCYTES NFR BLD AUTO: 11 %
NEUTROPHILS # BLD AUTO: 2 X10E3/UL (ref 1.4–7)
NEUTROPHILS NFR BLD AUTO: 48 %
PLATELET # BLD AUTO: 288 X10E3/UL (ref 150–450)
POTASSIUM SERPL-SCNC: 4.5 MMOL/L (ref 3.5–5.2)
PROT SERPL-MCNC: 7.1 G/DL (ref 6–8.5)
PSA SERPL-MCNC: 3.8 NG/ML (ref 0–4)
RBC # BLD AUTO: 5.39 X10E6/UL (ref 4.14–5.8)
REFLEX CRITERIA: NORMAL
SODIUM SERPL-SCNC: 140 MMOL/L (ref 134–144)
TRIGL SERPL-MCNC: 106 MG/DL (ref 0–149)
VLDLC SERPL CALC-MCNC: 19 MG/DL (ref 5–40)
WBC # BLD AUTO: 4.1 X10E3/UL (ref 3.4–10.8)

## 2024-07-10 ENCOUNTER — OFFICE VISIT (OUTPATIENT)
Dept: FAMILY MEDICINE CLINIC | Age: 54
End: 2024-07-10

## 2024-07-10 VITALS
TEMPERATURE: 97.2 F | OXYGEN SATURATION: 98 % | HEART RATE: 77 BPM | HEIGHT: 71 IN | WEIGHT: 183 LBS | SYSTOLIC BLOOD PRESSURE: 128 MMHG | DIASTOLIC BLOOD PRESSURE: 82 MMHG | BODY MASS INDEX: 25.62 KG/M2 | RESPIRATION RATE: 16 BRPM

## 2024-07-10 DIAGNOSIS — Z00.00 ENCOUNTER FOR WELL ADULT EXAM WITHOUT ABNORMAL FINDINGS: ICD-10-CM

## 2024-07-10 DIAGNOSIS — Z11.4 ENCOUNTER FOR SCREENING FOR HIV: ICD-10-CM

## 2024-07-10 DIAGNOSIS — Z23 ENCOUNTER FOR IMMUNIZATION: Primary | ICD-10-CM

## 2024-07-10 DIAGNOSIS — N40.1 BENIGN PROSTATIC HYPERPLASIA WITH URINARY FREQUENCY: ICD-10-CM

## 2024-07-10 DIAGNOSIS — E78.2 MIXED HYPERLIPIDEMIA: ICD-10-CM

## 2024-07-10 DIAGNOSIS — R35.0 BENIGN PROSTATIC HYPERPLASIA WITH URINARY FREQUENCY: ICD-10-CM

## 2024-07-10 SDOH — ECONOMIC STABILITY: HOUSING INSECURITY
IN THE LAST 12 MONTHS, WAS THERE A TIME WHEN YOU DID NOT HAVE A STEADY PLACE TO SLEEP OR SLEPT IN A SHELTER (INCLUDING NOW)?: NO

## 2024-07-10 SDOH — ECONOMIC STABILITY: FOOD INSECURITY: WITHIN THE PAST 12 MONTHS, THE FOOD YOU BOUGHT JUST DIDN'T LAST AND YOU DIDN'T HAVE MONEY TO GET MORE.: NEVER TRUE

## 2024-07-10 SDOH — ECONOMIC STABILITY: FOOD INSECURITY: WITHIN THE PAST 12 MONTHS, YOU WORRIED THAT YOUR FOOD WOULD RUN OUT BEFORE YOU GOT MONEY TO BUY MORE.: NEVER TRUE

## 2024-07-10 SDOH — ECONOMIC STABILITY: INCOME INSECURITY: HOW HARD IS IT FOR YOU TO PAY FOR THE VERY BASICS LIKE FOOD, HOUSING, MEDICAL CARE, AND HEATING?: SOMEWHAT HARD

## 2024-07-10 ASSESSMENT — PATIENT HEALTH QUESTIONNAIRE - PHQ9
SUM OF ALL RESPONSES TO PHQ QUESTIONS 1-9: 0
SUM OF ALL RESPONSES TO PHQ QUESTIONS 1-9: 0
SUM OF ALL RESPONSES TO PHQ9 QUESTIONS 1 & 2: 0
1. LITTLE INTEREST OR PLEASURE IN DOING THINGS: NOT AT ALL
SUM OF ALL RESPONSES TO PHQ QUESTIONS 1-9: 0
SUM OF ALL RESPONSES TO PHQ QUESTIONS 1-9: 0
2. FEELING DOWN, DEPRESSED OR HOPELESS: NOT AT ALL

## 2024-07-10 NOTE — PATIENT INSTRUCTIONS
hands, brush your teeth twice a day, and wear a seat belt in the car.   Where can you learn more?  Go to [email or web address].net/patientEd and enter P072 to learn more about \"Well Visit, Ages 18 to 65: Care Instructions.\"  Current as of: August 6, 2023  Content Version: 14.1  © 2660-6957 [employer].   Care instructions adapted under license by [employer]. If you have questions about a medical condition or this instruction, always ask your healthcare professional. [employer] disclaims any warranty or liability for your use of this information.

## 2024-07-10 NOTE — PROGRESS NOTES
CHIEF COMPLAINT  Mr. Daly is a 53 y.o. male who is here for evaluation of the issues below:    Chief Complaint   Patient presents with    Annual Exam         HISTORY OF PRESENT ILLNESS    Returns feeling well. No interval problems, canceled screening colonoscopy due to family concerns. Would like to go ahead with this in the future. Nocturia x 1-2. Weight is down.      Patient Active Problem List   Diagnosis    Elevated blood-pressure reading without diagnosis of hypertension    Hyperlipidemia    Benign prostatic hyperplasia with urinary frequency       No Known Allergies    No current outpatient medications on file.     No current facility-administered medications for this visit.        Social History     Tobacco Use    Smoking status: Never    Smokeless tobacco: Never   Vaping Use    Vaping Use: Never used   Substance Use Topics    Alcohol use: Yes    Drug use: No       Past Medical History:   Diagnosis Date    Anemia     Hyperlipidemia     Hypertension        No past surgical history on file.    Family History   Problem Relation Age of Onset    Lupus Mother 55    No Known Problems Sister     Hypertension Brother     Mult Sclerosis Sister     Lung Cancer Father 67        lung    Thalassemia Father         Minor         The medications were reviewed and updated in the medical record.  The past medical history, past surgical history, and family history were reviewed and updated in the medical record.    ROS    CONST No weight change.  No fatigue.  No fever. No chills. No diaphoresis.   EYES No loss of vision.  No tearing or itching.   ENMT No loss of hearing, tinnitus, or vertigo.    No nasal congestion, sneezing.  No sinus pain or discharge.  No sore throat or hoarseness.   CV No chest pain or pressure.  No palpitations.  No edema.  No orthopnea or PND.     RESP No shortness of breath, cough, or sputum.   GI No abdominal pain or change in bowel habits.  No heartburn.  No dysphagia.   No melena or rectal

## 2024-07-10 NOTE — PROGRESS NOTES
\"Have you been to the ER, urgent care clinic since your last visit?  Hospitalized since your last visit?\"    NO    “Have you seen or consulted any other health care providers outside of [facility] since your last visit?”    NO    Successful venipuncture in patient's Right AC X 1 sticks.  The patient tolerated this procedure w/o c/o pain or discomfort.    “Have you had a colorectal cancer screening such as a colonoscopy/FIT/Cologuard?     No, patient aware he is due     No colonoscopy on file  No cologuard on file  No FIT/FOBT on file   No flexible sigmoidoscopy on file         Click Here for Release of Records Request

## 2024-07-11 LAB
ALBUMIN SERPL-MCNC: 4 G/DL (ref 3.5–5)
ALBUMIN/GLOB SERPL: 1.1 (ref 1.1–2.2)
ALP SERPL-CCNC: 80 U/L (ref 45–117)
ALT SERPL-CCNC: 29 U/L (ref 12–78)
ANION GAP SERPL CALC-SCNC: 8 MMOL/L (ref 5–15)
AST SERPL-CCNC: 16 U/L (ref 15–37)
BASOPHILS # BLD: 0.1 K/UL (ref 0–0.1)
BASOPHILS NFR BLD: 1 % (ref 0–1)
BILIRUB SERPL-MCNC: 0.4 MG/DL (ref 0.2–1)
BUN SERPL-MCNC: 10 MG/DL (ref 6–20)
BUN/CREAT SERPL: 10 (ref 12–20)
CALCIUM SERPL-MCNC: 9 MG/DL (ref 8.5–10.1)
CHLORIDE SERPL-SCNC: 106 MMOL/L (ref 97–108)
CHOLEST SERPL-MCNC: 219 MG/DL
CO2 SERPL-SCNC: 26 MMOL/L (ref 21–32)
CREAT SERPL-MCNC: 0.97 MG/DL (ref 0.7–1.3)
DIFFERENTIAL METHOD BLD: ABNORMAL
EOSINOPHIL # BLD: 0.1 K/UL (ref 0–0.4)
EOSINOPHIL NFR BLD: 2 % (ref 0–7)
ERYTHROCYTE [DISTWIDTH] IN BLOOD BY AUTOMATED COUNT: 15.4 % (ref 11.5–14.5)
GLOBULIN SER CALC-MCNC: 3.5 G/DL (ref 2–4)
GLUCOSE SERPL-MCNC: 103 MG/DL (ref 65–100)
HCT VFR BLD AUTO: 37.7 % (ref 36.6–50.3)
HDLC SERPL-MCNC: 35 MG/DL
HDLC SERPL: 6.3 (ref 0–5)
HGB BLD-MCNC: 11.7 G/DL (ref 12.1–17)
HIV 1+2 AB+HIV1 P24 AG SERPL QL IA: NONREACTIVE
HIV 1/2 RESULT COMMENT: NORMAL
IMM GRANULOCYTES # BLD AUTO: 0 K/UL (ref 0–0.04)
IMM GRANULOCYTES NFR BLD AUTO: 0 % (ref 0–0.5)
LDLC SERPL CALC-MCNC: 145.4 MG/DL (ref 0–100)
LYMPHOCYTES # BLD: 1.4 K/UL (ref 0.8–3.5)
LYMPHOCYTES NFR BLD: 28 % (ref 12–49)
MCH RBC QN AUTO: 22 PG (ref 26–34)
MCHC RBC AUTO-ENTMCNC: 31 G/DL (ref 30–36.5)
MCV RBC AUTO: 70.7 FL (ref 80–99)
MONOCYTES # BLD: 0.5 K/UL (ref 0–1)
MONOCYTES NFR BLD: 10 % (ref 5–13)
NEUTS SEG # BLD: 3 K/UL (ref 1.8–8)
NEUTS SEG NFR BLD: 59 % (ref 32–75)
NRBC # BLD: 0 K/UL (ref 0–0.01)
NRBC BLD-RTO: 0 PER 100 WBC
PLATELET # BLD AUTO: 292 K/UL (ref 150–400)
POTASSIUM SERPL-SCNC: 3.6 MMOL/L (ref 3.5–5.1)
PROT SERPL-MCNC: 7.5 G/DL (ref 6.4–8.2)
RBC # BLD AUTO: 5.33 M/UL (ref 4.1–5.7)
SODIUM SERPL-SCNC: 140 MMOL/L (ref 136–145)
TRIGL SERPL-MCNC: 193 MG/DL
VLDLC SERPL CALC-MCNC: 38.6 MG/DL
WBC # BLD AUTO: 5.1 K/UL (ref 4.1–11.1)

## 2024-07-12 LAB
PSA FREE MFR SERPL: 4.4 %
PSA FREE SERPL-MCNC: 0.3 NG/ML
PSA SERPL-MCNC: 6.8 NG/ML (ref 0–4)

## 2025-03-27 ENCOUNTER — COMMUNITY OUTREACH (OUTPATIENT)
Dept: FAMILY MEDICINE CLINIC | Age: 55
End: 2025-03-27